# Patient Record
(demographics unavailable — no encounter records)

---

## 2025-01-08 NOTE — PHYSICAL EXAM
[No Acute Distress] : no acute distress [Normal Oropharynx] : normal oropharynx [II] : Mallampati Class: II [Normal Appearance] : normal appearance [Normal Rate/Rhythm] : normal rate/rhythm [Normal S1, S2] : normal s1, s2 [No Resp Distress] : no resp distress [Clear to Auscultation Bilaterally] : clear to auscultation bilaterally [No Abnormalities] : no abnormalities [Normal Gait] : normal gait [No Clubbing] : no clubbing [Non-Pitting] : non-pitting [Normal Color/ Pigmentation] : normal color/ pigmentation [No Focal Deficits] : no focal deficits [Oriented x3] : oriented x3 [Normal Affect] : normal affect

## 2025-01-10 NOTE — ASSESSMENT
[FreeTextEntry1] : ~age~yo woman and former smoker w/ PMH HTN, Afib on Xarelto, HFpEF w/ mod-sev TR, MR, severe pulmonary HTN, chronic hypoxemic resp failure w/ hypercapnea on PRN home O2 2L, charted hx of COPD and multiple hospitalizations for decompensated CHF and COPD referred by inpatient cardiologist Dr. Bergeron to establish care. Following for ?copd vs. severe PH w/ mosaicism and DPLD.   Data Reviewed: PSG (12/2024) - negative for sleep disordered breathing, AHI 0.2; nocturnal hypoxemia out of proportion to and not associated with respiratory events (O2% jean 80%)   Complicated medical history with many hospitalizations in the last 2-3 years. Based on several echocardiograms, it is evident pt has significant valvular disease with both severe TR and MR, notably eccentric jet MR. 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 Has not had sleep study yet to rule out CUCA and since this keeps getting delayed via outside provider's referral, will refer her again today through my office. On DOAC making untreated CTEPH very unlikely though can pursue V/Q scan at a follow-up visit pending initial workup. PFT is not wholly consistent with COPD though, and her CT does not suggest ILD as etiology for her restrictive lung defect. DLCO reduction likely related to PH. There does not appear to be any radiographic evidence of emphysema to explain this degree of DLCO reduction if it was from COPD, and COPD is not ruled in on her initial spirometry.   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 #Severe pulmonary HTN - suspect principal group II based on RHC; cannot rule out aspect of group III from CUCA; unlikely group IV on DOAC; on PRN 2L O2 now principally at night #Mitral regurgitation w/ HFpEF - NYHA IV #CHRF on PRN home O2 - likely 2/2 PH as opposed to airflow disease  Plan:  **** - cont albuterol MDI 1-2 puffs q4-6h PRN - cont Trelegy Ellipta inhaler though I am still doubtful to what degree COPD is playing a role in her respiratory symptoms; recent hospitalizations appear to be for decompensated HFpEF / med non-compliance; given subjective improvement on Trelegy thus far will continue this in event airflow limitation is contributing to her overall symptoms - still hasn't scheduled PSG for sleep evaluation on multiple follow-ups --> re-emphasized the importance of testing at length with her today, re-refer to [facility] today for comprehensive PSG as I suspect patient will need CPAP/BIPAP qHS and has already demonstrated air trapping and multiple comorbidities with PH and CHF; uses O2 at night as well - cont diuretic regimen per cardiology i/s/o PH - has seen IP; pt not interested in lung biopsy - her reported smoking hx does not qualify her for LDCT program  RTO 3-6mos or sooner pending sleep eval

## 2025-01-10 NOTE — HISTORY OF PRESENT ILLNESS
[Former] : former [< 20 pack-years] : < 20 pack-years [Never] : never [Awakes Unrefreshed] : awakes unrefreshed [Awakes with Dry Mouth] : awakes with dry mouth [Difficulty Initiating Sleep] : difficulty initiating sleep [Difficulty Maintaining Sleep] : difficulty maintaining sleep [Snoring] : snoring [Lab] : lab [TextBox_4] : ~age~yo woman and former smoker w/ PMH HTN, Afib on Xarelto, HFpEF w/ mod-sev TR, MR, severe pulmonary HTN, chronic hypoxemic resp failure w/ hypercapnea on PRN home O2 2L and refusing home AVAPS per inpatient chart review, charted hx of COPD and multiple hospitalizations for decompensated CHF and COPD referred by inpatient cardiologist Dr. Bergeron to establish care.  Recently hospitalized for decompensated CHF and also treated for COPD in 2023. Multiple hospitalizations at [facility] for similar issues. Does not remember if she ever had PFTs done. On Symbicort, no other inhalers. Rinses mouth after. No baseline cough or sputa. Does not think she ever wheezes. Quit smoking  and says she was only smoking 3 cigs a day for the prior years, at most was under 0.5ppd since age 28. Says most of her breathing problems start when she retains fluid and is usually on bumex or lasix. Usually puts 2L O2 on at night when she sleeps and checks her oxygen every day. Rarely ever needs it beyond at night for sleep.   [3/13/24]  Here for follow-up after two recent hospitalizations. Was at [facility] last week of 2024 for AHRF and decompensated CHF - per hospital paperwork was off diuretic meds for few days prior to acute symptoms of dyspnea; briefly required NiV during that time. She was discharged home on different medications that she says did not work for her and re-presented this time to [facility] last week for similar issues. Was given IV diuresis with improvement and adjunct NiV then discharged home to follow-up. She followed-up with her cardiologist who added Aldactone to her diuretic regimen. She feels well today overall with much improvement in her breathing. She is no longer using O2 continuously but still puts 2L on at night while she sleeps. Has sleep study scheduled next week through her primary and says she is willing to try CPAP if needed.   [24] Here for 3mo f/u. Doing much better overall today. Not using 2L NC oxygen as much at night, and says during the day she hasn't needed it. O2 levels generally remain above 92% and only get closer to that number when exerting herself. Says the Trelegy has made her feel much better and noticed a big improvement. Rinsing mouth after dose as directed. Had seen interventional pulm Dr. Cabezas at [facility] via referral from her PMD regarding pulmonary HTN and abnormal chest CT. Pt was offered elective lung biopsy given mosaicism but declined. Still has not done sleep study and says she had to cancel due to a hospitalization in April and thinks her referral . Willing to reschedule sleep test with new referral today.   [24] 3mo sched f/u. Feeling well. Has started exercising regularly, weight training at the gym 1-2hrs a day and walks 1-2 miles a day in the mornings. Lost 10-15lbs so far with these efforts and is proud. No new respiratory complaints. Doing Trelegy daily with rinsing and thinks its been very helpful, needs refill. Still hasn't been able to schedule sleep study - originally had appt for October then cancelled because she had a conflict. Knows she needs to get test done and remains willing. No interval ED/UC visits for respiratory problems. Using 2L O2 at night during sleep, hasn't needed at all beyond that.   [1/10/25] Here for f/u. Had PSG in December, negative for sleep disordered breathing and had nocturnal desaturations were not associated with resp events.  [TextBox_13] : 34 [YearQuit] : 2019 [Awakes with Headache] : does not awaken with headache [TextBox_77] : 3249 [TextBox_79] : 3048 [TextBox_81] : 120 [TextBox_85] : 5h [TextBox_89] : 1 [TextBox_100] : 12/24 [TextBox_108] : 0.2 [TextBox_112] : 4 [TextBox_116] : 80 [TextBox_120] : Non-respiratory related desaturations [ESS] : 6 [TextBox_11] : 3

## 2025-01-10 NOTE — DISCUSSION/SUMMARY
[FreeTextEntry1] : CXR AP 1-view (PACS, 3/8/24) - interval decreased pulmonary vascular congestion CXR AP 1-view (PACS, 3/3/24) - bilateral interstitial changes with vascular congestion PFT (1/3/24) - moderate restriction w/ evidence of air trapping; severely reduced DLCO CTA PE study (PACS, 12/19/23) - no PE; dilated PA suggestive of PH; linear atelectasis RHC ([facility], 6/2021) - PVR < 3WU, PCWP>20, Shaggy CO 8.21 TTE (5/5/23) - EF55-60%, mod conc LVH, mild to mod eccentric MR, severe TR, severel LAE and KERRI, nl RV fxn, severe PH CT Chest w/ IVC (PACS, 12/25/22 - bands of atelectasis, mosaic attenuation consistent with air trapping  TEQUILA 10/31/22 - mod-severe eccentric, posteriorly directed MR; no evidence of ASD

## 2025-05-22 NOTE — HISTORY OF PRESENT ILLNESS
[Former] : former [< 20 pack-years] : < 20 pack-years [Never] : never [Awakes Unrefreshed] : awakes unrefreshed [Awakes with Dry Mouth] : awakes with dry mouth [Difficulty Initiating Sleep] : difficulty initiating sleep [Difficulty Maintaining Sleep] : difficulty maintaining sleep [Snoring] : snoring [TextBox_4] : ~age~yo woman and former smoker w/ PMH HTN, Afib on Xarelto, HFpEF w/ mod-sev TR, MR, severe pulmonary HTN, chronic hypoxemic resp failure w/ hypercapnea on PRN home O2 2L and refusing home AVAPS per inpatient chart review, charted hx of COPD and multiple hospitalizations for decompensated CHF and COPD referred by inpatient cardiologist Dr. Bergeron to establish care.  Recently hospitalized for decompensated CHF and also treated for COPD in 2023. Multiple hospitalizations at [facility] for similar issues. Does not remember if she ever had PFTs done. On Symbicort, no other inhalers. Rinses mouth after. No baseline cough or sputa. Does not think she ever wheezes. Quit smoking  and says she was only smoking 3 cigs a day for the prior years, at most was under 0.5ppd since age 28. Says most of her breathing problems start when she retains fluid and is usually on bumex or lasix. Usually puts 2L O2 on at night when she sleeps and checks her oxygen every day. Rarely ever needs it beyond at night for sleep.   [3/13/24]  Here for follow-up after two recent hospitalizations. Was at [facility] last week of 2024 for AHRF and decompensated CHF - per hospital paperwork was off diuretic meds for few days prior to acute symptoms of dyspnea; briefly required NiV during that time. She was discharged home on different medications that she says did not work for her and re-presented this time to [facility] last week for similar issues. Was given IV diuresis with improvement and adjunct NiV then discharged home to follow-up. She followed-up with her cardiologist who added Aldactone to her diuretic regimen. She feels well today overall with much improvement in her breathing. She is no longer using O2 continuously but still puts 2L on at night while she sleeps. Has sleep study scheduled next week through her primary and says she is willing to try CPAP if needed.   [24] Here for 3mo f/u. Doing much better overall today. Not using 2L NC oxygen as much at night, and says during the day she hasn't needed it. O2 levels generally remain above 92% and only get closer to that number when exerting herself. Says the Trelegy has made her feel much better and noticed a big improvement. Rinsing mouth after dose as directed. Had seen interventional pulm Dr. Cabezas at [facility] via referral from her PMD regarding pulmonary HTN and abnormal chest CT. Pt was offered elective lung biopsy given mosaicism but declined. Still has not done sleep study and says she had to cancel due to a hospitalization in April and thinks her referral . Willing to reschedule sleep test with new referral today.   [24] 3mo sched f/u. Feeling well. Has started exercising regularly, weight training at the gym 1-2hrs a day and walks 1-2 miles a day in the mornings. Lost 10-15lbs so far with these efforts and is proud. No new respiratory complaints. Doing Trelegy daily with rinsing and thinks its been very helpful, needs refill. Still hasn't been able to schedule sleep study - originally had appt for October then cancelled because she had a conflict. Knows she needs to get test done and remains willing. No interval ED/UC visits for respiratory problems. Using 2L O2 at night during sleep, hasn't needed at all beyond that.   [25] Here for hospital f/u after CHF exacerbation last month at [facility]. Needs new cardio referral. Says she feels "okay" but not great since discharge. Using O2 mostly continuously since then, 2LPM. Uses 4LPM if she gets very winded. Taking diuretics and checking her weights. Says she isn't using AVAPS at night because the mask feels very big on her. Finds Trelegy to be helpful. Needs disability papers filled out to help her with her living situation.  [TextBox_13] : 34 [YearQuit] : 2019 [Awakes with Headache] : does not awaken with headache [TextBox_77] : 5517 [TextBox_79] : 9222 [TextBox_81] : 120 [TextBox_85] : 5h [TextBox_89] : 1 [ESS] : 6 [TextBox_11] : 3

## 2025-05-22 NOTE — DISCUSSION/SUMMARY
[FreeTextEntry1] : CXR AP 1-view (PACS, 3/8/24) - interval decreased pulmonary vascular congestion CXR AP 1-view (PACS, 3/3/24) - bilateral interstitial changes with vascular congestion PFT (1/3/24) - moderate restriction w/ evidence of air trapping; severely reduced DLCO CTA PE study (PACS, 12/19/23) - no PE; dilated PA suggestive of PH; linear atelectasis RHC ([facility], 6/2021) - PVR < 3WU, PCWP>20, Shaggy CO 8.21 TTE (5/5/23) - EF55-60%, mod conc LVH, mild to mod eccentric MR, severe TR, severel LAE and KERRI, nl RV fxn, severe PH CT Chest w/ IVC (PACS, 12/25/22 - bands of atelectasis, mosaic attenuation consistent with air trapping  TEQUILA 10/31/22 - mod-severe eccentric, posteriorly directed MR; no evidence of ASD

## 2025-05-22 NOTE — ASSESSMENT
[FreeTextEntry1] : ~age~yo woman and former smoker w/ PMH HTN, Afib on Xarelto, HFpEF w/ mod-sev TR, MR, severe pulmonary HTN, chronic hypoxemic resp failure w/ hypercapnea on PRN home O2 2L and refusing home AVAPS per inpatient chart review, charted hx of COPD and multiple hospitalizations for decompensated CHF and COPD referred by inpatient cardiologist Dr. Bergeron to establish care. Following for ?copd vs. severe PH w/ mosaicism and DPLD.   Data Reviewed: PSG (12/2024) - negative for CUCA Hospital discharge summary (4/2025)  Complicated medical history with many hospitalizations in the last 2-3 years. Based on several echocardiograms, it is evident pt has significant valvular disease with both severe TR and MR, notably eccentric jet MR. 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 Sleep testing was negative for CUCA so not contributing to PH. On DOAC making untreated CTEPH very unlikely. PFT is not wholly consistent with COPD though, and her CT does not suggest ILD as etiology for her restrictive lung defect. DLCO reduction likely related to PH. There does not appear to be any radiographic evidence of emphysema to explain this degree of DLCO reduction if it was from COPD (and COPD is not ruled in on her spirometry).   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  Plan: - cont albuterol MDI 1-2 puffs q4-6h PRN - cont Trelegy Ellipta inhaler though I am still doubtful to what degree COPD is playing a role in her respiratory symptoms; recent hospitalizations appear to be for decompensated HFpEF / med non-compliance; given subjective improvement on Trelegy thus far will continue this in event airflow limitation is contributing to her overall symptoms - PSG completed - neg for CUCA - repeat PFT/6MWT at next f/u appt - cont diuretic regimen per cardiology i/s/o PH - she requested new cardio referral; provided today - has seen IP; pt not interested in lung biopsy - her reported smoking hx does not qualify her for LDCT program - extensively discussed importance of home AVAPS use/compliance as she was not using since hospital discharge; she agreed to resume use  RTO 3mos